# Patient Record
Sex: FEMALE | Race: WHITE | HISPANIC OR LATINO | URBAN - METROPOLITAN AREA
[De-identification: names, ages, dates, MRNs, and addresses within clinical notes are randomized per-mention and may not be internally consistent; named-entity substitution may affect disease eponyms.]

---

## 2017-02-07 ENCOUNTER — EMERGENCY (EMERGENCY)
Facility: HOSPITAL | Age: 22
LOS: 1 days | Discharge: HOME | End: 2017-02-07
Admitting: EMERGENCY MEDICINE
Payer: OTHER MISCELLANEOUS

## 2017-02-07 VITALS
TEMPERATURE: 98 F | HEART RATE: 64 BPM | DIASTOLIC BLOOD PRESSURE: 50 MMHG | SYSTOLIC BLOOD PRESSURE: 105 MMHG | RESPIRATION RATE: 16 BRPM | OXYGEN SATURATION: 100 %

## 2017-02-07 PROCEDURE — 99053 MED SERV 10PM-8AM 24 HR FAC: CPT

## 2017-02-07 PROCEDURE — 99283 EMERGENCY DEPT VISIT LOW MDM: CPT | Mod: 25

## 2017-02-07 NOTE — ED PROVIDER NOTE - PROGRESS NOTE DETAILS
The scribe's documentation has been prepared under my direction and personally reviewed by me in its entirety. I confirm that the note above accurately reflects all work, treatment, procedures, and medical decision making performed by me.  AMAN DE LA ROSA AMAN DE LA ROSA: I discussed with patient regarding prophylactic HIV meds, ppt refusing at this time, will f/u with [facility]

## 2017-02-07 NOTE — ED PROVIDER NOTE - OBJECTIVE STATEMENT
22 y/o female with no significant PMHx presents to the ED for needle stick injury at work today. 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 Unknown name of individual contacted and medical Hx. Pt rinsed and washed the are with water. Denies headache, neck pain, visual disturbances, fevers, chills, nausea, vomiting, chest pain, sob, abdominal pain, urinary symptoms, pelvic pain, vaginal bleeding, saddle anesthesia, loss of bowel/bladder, numbness/weakness/tingling, recent travel, sick contact, social history. Denies chances of pregnancy. 22 y/o female with no significant PMHx presents to the ED for needle stick injury at work today. 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 Unknown name of individual contacted and medical Hx. Pt rinsed and washed the are with water. Denies headache, neck pain, visual disturbances, fevers, chills, nausea, vomiting, chest pain, sob, abdominal pain, urinary symptoms, pelvic pain, vaginal bleeding, saddle anesthesia, loss of bowel/bladder, numbness/weakness/tingling, recent travel, sick contact, social history. Denies chances of pregnancy.

## 2017-02-07 NOTE — ED ADULT TRIAGE NOTE - CHIEF COMPLAINT QUOTE
pt. 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 and was stuck through a glove w/ the vaccutainer.  Small needle ami noted on pt.'s L pointer finger, no active bleeding noted.  Denies PMHx.

## 2017-02-07 NOTE — ED PROVIDER NOTE - NS ED MD SCRIBE ATTENDING SCRIBE SECTIONS
REVIEW OF SYSTEMS/VITAL SIGNS( Pullset)/HISTORY OF PRESENT ILLNESS/PAST MEDICAL/SURGICAL/SOCIAL HISTORY/DISPOSITION/HIV

## 2017-02-07 NOTE — ED PROVIDER NOTE - CHPI ED SYMPTOMS NEG
no nausea/no fever/no weakness/no tingling/no chills/no dizziness/no vomiting/no decreased eating/drinking/no numbness

## 2018-01-07 NOTE — ED PROVIDER NOTE - PSYCHIATRIC NEGATIVE STATEMENT, MLM
- afebrile overnight, non-toxic appearing  - c/w Vancomycin at current dose and Ceftaroline to complete on 1/10/18, Vanco trough therapeutic  - s/p PICC line replacement via IR for malpositioned PICC on 1/3 no known mental health issues.

## 2018-08-15 ENCOUNTER — RESULT REVIEW (OUTPATIENT)
Age: 23
End: 2018-08-15

## 2019-03-29 ENCOUNTER — EMERGENCY (EMERGENCY)
Facility: HOSPITAL | Age: 24
LOS: 1 days | Discharge: HOME | End: 2019-03-29
Admitting: EMERGENCY MEDICINE
Payer: OTHER MISCELLANEOUS

## 2019-03-29 VITALS
RESPIRATION RATE: 16 BRPM | DIASTOLIC BLOOD PRESSURE: 67 MMHG | TEMPERATURE: 98 F | HEART RATE: 76 BPM | OXYGEN SATURATION: 100 % | SYSTOLIC BLOOD PRESSURE: 112 MMHG

## 2019-03-29 PROCEDURE — 99282 EMERGENCY DEPT VISIT SF MDM: CPT | Mod: 25

## 2019-03-29 PROCEDURE — 99053 MED SERV 10PM-8AM 24 HR FAC: CPT

## 2019-03-29 NOTE — ED PROVIDER NOTE - NSFOLLOWUPINSTRUCTIONS_ED_ALL_ED_FT
Follow up with [facility] in 2-3 days [phone].  Clean affected area with soap and water.  Return to the ER for any persistent/worsening or new symptoms fevers, chills, redness, weakness, numbness or any concerning symptoms.

## 2019-03-29 NOTE — ED PROVIDER NOTE - OBJECTIVE STATEMENT
24 yo F with no pertinent PMHx presents to the [facility] ED s/p needle stick injury. Pt works as a PCA at [employer], she was using lancet to check source patient, glucose and stuck her self in the 4th digit right digit. Pt reports bleeding afterwards, however no active bleeding now. Slight pain over the area of entry. Denies fever, chills, discharge. Pt reports that source patient had no known PMHX of HIV, hepatitis. 24 yo F with no pertinent PMHx presents to the [facility] ED s/p needle stick injury. 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 Pt reports bleeding afterwards, however no active bleeding now. Slight pain over the area of entry. Denies fever, chills, discharge. Pt reports that source patient had no known PMHX of HIV, hepatitis.

## 2019-03-29 NOTE — ED PROVIDER NOTE - SKIN WOUND TYPE
pinpoint needle stick injury noted to 4th digit pad, no erythema, no discharge, no active bleeding/PUNCTURE WOUND(S)

## 2019-03-29 NOTE — ED PROVIDER NOTE - PLAN OF CARE
Follow up with [facility] in 2-3 days [phone].  Clean affected area with soap and water.  Return to the ER for any persistent/worsening or new symptoms fevers, chills, redness, weakness, numbness or any concerning symptoms.

## 2019-03-29 NOTE — ED PROVIDER NOTE - CARE PLAN
Principal Discharge DX:	Needle stick injury of finger  Assessment and plan of treatment:	Follow up with [facility] in 2-3 days [phone].  Clean affected area with soap and water.  Return to the ER for any persistent/worsening or new symptoms fevers, chills, redness, weakness, numbness or any concerning symptoms.

## 2019-03-29 NOTE — ED ADULT TRIAGE NOTE - CHIEF COMPLAINT QUOTE
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

## 2020-04-29 ENCOUNTER — MESSAGE (OUTPATIENT)
Age: 25
End: 2020-04-29

## 2020-05-09 LAB
SARS-COV-2 IGG SERPL IA-ACNC: 0 INDEX
SARS-COV-2 IGG SERPL QL IA: NEGATIVE

## 2021-03-05 ENCOUNTER — EMERGENCY (EMERGENCY)
Facility: HOSPITAL | Age: 26
LOS: 1 days | Discharge: HOME | End: 2021-03-05
Attending: EMERGENCY MEDICINE | Admitting: EMERGENCY MEDICINE
Payer: COMMERCIAL

## 2021-03-05 VITALS
RESPIRATION RATE: 18 BRPM | TEMPERATURE: 98 F | DIASTOLIC BLOOD PRESSURE: 85 MMHG | HEART RATE: 112 BPM | OXYGEN SATURATION: 100 % | SYSTOLIC BLOOD PRESSURE: 128 MMHG

## 2021-03-05 VITALS
HEART RATE: 100 BPM | SYSTOLIC BLOOD PRESSURE: 122 MMHG | OXYGEN SATURATION: 100 % | DIASTOLIC BLOOD PRESSURE: 69 MMHG | RESPIRATION RATE: 18 BRPM

## 2021-03-05 LAB
ALBUMIN SERPL ELPH-MCNC: 4.8 G/DL — SIGNIFICANT CHANGE UP (ref 3.3–5)
ALP SERPL-CCNC: 76 U/L — SIGNIFICANT CHANGE UP (ref 40–120)
ALT FLD-CCNC: 21 U/L — SIGNIFICANT CHANGE UP (ref 4–33)
ANION GAP SERPL CALC-SCNC: 12 MMOL/L — SIGNIFICANT CHANGE UP (ref 7–14)
APTT BLD: 36.5 SEC — HIGH (ref 27–36.3)
AST SERPL-CCNC: 23 U/L — SIGNIFICANT CHANGE UP (ref 4–32)
BASOPHILS # BLD AUTO: 0.01 K/UL — SIGNIFICANT CHANGE UP (ref 0–0.2)
BASOPHILS NFR BLD AUTO: 0.1 % — SIGNIFICANT CHANGE UP (ref 0–2)
BILIRUB SERPL-MCNC: 0.2 MG/DL — SIGNIFICANT CHANGE UP (ref 0.2–1.2)
BUN SERPL-MCNC: 15 MG/DL — SIGNIFICANT CHANGE UP (ref 7–23)
CALCIUM SERPL-MCNC: 9.8 MG/DL — SIGNIFICANT CHANGE UP (ref 8.4–10.5)
CHLORIDE SERPL-SCNC: 102 MMOL/L — SIGNIFICANT CHANGE UP (ref 98–107)
CO2 SERPL-SCNC: 26 MMOL/L — SIGNIFICANT CHANGE UP (ref 22–31)
CREAT SERPL-MCNC: 0.75 MG/DL — SIGNIFICANT CHANGE UP (ref 0.5–1.3)
EOSINOPHIL # BLD AUTO: 0.04 K/UL — SIGNIFICANT CHANGE UP (ref 0–0.5)
EOSINOPHIL NFR BLD AUTO: 0.6 % — SIGNIFICANT CHANGE UP (ref 0–6)
GLUCOSE SERPL-MCNC: 87 MG/DL — SIGNIFICANT CHANGE UP (ref 70–99)
HCT VFR BLD CALC: 39.7 % — SIGNIFICANT CHANGE UP (ref 34.5–45)
HGB BLD-MCNC: 12.8 G/DL — SIGNIFICANT CHANGE UP (ref 11.5–15.5)
IANC: 5.83 K/UL — SIGNIFICANT CHANGE UP (ref 1.5–8.5)
IMM GRANULOCYTES NFR BLD AUTO: 0.3 % — SIGNIFICANT CHANGE UP (ref 0–1.5)
INR BLD: 1.09 RATIO — SIGNIFICANT CHANGE UP (ref 0.88–1.16)
LYMPHOCYTES # BLD AUTO: 0.51 K/UL — LOW (ref 1–3.3)
LYMPHOCYTES # BLD AUTO: 7.1 % — LOW (ref 13–44)
MCHC RBC-ENTMCNC: 29.2 PG — SIGNIFICANT CHANGE UP (ref 27–34)
MCHC RBC-ENTMCNC: 32.2 GM/DL — SIGNIFICANT CHANGE UP (ref 32–36)
MCV RBC AUTO: 90.4 FL — SIGNIFICANT CHANGE UP (ref 80–100)
MONOCYTES # BLD AUTO: 0.76 K/UL — SIGNIFICANT CHANGE UP (ref 0–0.9)
MONOCYTES NFR BLD AUTO: 10.6 % — SIGNIFICANT CHANGE UP (ref 2–14)
NEUTROPHILS # BLD AUTO: 5.83 K/UL — SIGNIFICANT CHANGE UP (ref 1.8–7.4)
NEUTROPHILS NFR BLD AUTO: 81.3 % — HIGH (ref 43–77)
NRBC # BLD: 0 /100 WBCS — SIGNIFICANT CHANGE UP
NRBC # FLD: 0 K/UL — SIGNIFICANT CHANGE UP
PLATELET # BLD AUTO: 213 K/UL — SIGNIFICANT CHANGE UP (ref 150–400)
POTASSIUM SERPL-MCNC: 3.6 MMOL/L — SIGNIFICANT CHANGE UP (ref 3.5–5.3)
POTASSIUM SERPL-SCNC: 3.6 MMOL/L — SIGNIFICANT CHANGE UP (ref 3.5–5.3)
PROT SERPL-MCNC: 8.3 G/DL — SIGNIFICANT CHANGE UP (ref 6–8.3)
PROTHROM AB SERPL-ACNC: 12.4 SEC — SIGNIFICANT CHANGE UP (ref 10.6–13.6)
RBC # BLD: 4.39 M/UL — SIGNIFICANT CHANGE UP (ref 3.8–5.2)
RBC # FLD: 12.1 % — SIGNIFICANT CHANGE UP (ref 10.3–14.5)
SARS-COV-2 RNA SPEC QL NAA+PROBE: DETECTED
SODIUM SERPL-SCNC: 140 MMOL/L — SIGNIFICANT CHANGE UP (ref 135–145)
TSH SERPL-MCNC: 1.81 UIU/ML — SIGNIFICANT CHANGE UP (ref 0.27–4.2)
WBC # BLD: 7.17 K/UL — SIGNIFICANT CHANGE UP (ref 3.8–10.5)
WBC # FLD AUTO: 7.17 K/UL — SIGNIFICANT CHANGE UP (ref 3.8–10.5)

## 2021-03-05 PROCEDURE — 99284 EMERGENCY DEPT VISIT MOD MDM: CPT | Mod: 25

## 2021-03-05 PROCEDURE — 93010 ELECTROCARDIOGRAM REPORT: CPT

## 2021-03-05 PROCEDURE — 71045 X-RAY EXAM CHEST 1 VIEW: CPT | Mod: 26

## 2021-03-05 RX ORDER — ACETAMINOPHEN 500 MG
975 TABLET ORAL ONCE
Refills: 0 | Status: COMPLETED | OUTPATIENT
Start: 2021-03-05 | End: 2021-03-05

## 2021-03-05 RX ORDER — SODIUM CHLORIDE 9 MG/ML
1000 INJECTION INTRAMUSCULAR; INTRAVENOUS; SUBCUTANEOUS ONCE
Refills: 0 | Status: COMPLETED | OUTPATIENT
Start: 2021-03-05 | End: 2021-03-05

## 2021-03-05 RX ADMIN — Medication 975 MILLIGRAM(S): at 16:37

## 2021-03-05 RX ADMIN — SODIUM CHLORIDE 1000 MILLILITER(S): 9 INJECTION INTRAMUSCULAR; INTRAVENOUS; SUBCUTANEOUS at 15:06

## 2021-03-05 RX ADMIN — Medication 975 MILLIGRAM(S): at 15:30

## 2021-03-05 NOTE — ED PROVIDER NOTE - NSFOLLOWUPINSTRUCTIONS_ED_ALL_ED_FT
-- Please avoid contact with others while you are symptomatic and for two weeks.  -- This is likely a virus, possibly COVID 19.  There is no direct treatment for a virus, and antibiotics are not effective.     -- Rest, increase your fluid intake, and avoid dehydration.  -- You can check your oxygen levels at home with a Pulse Oximeter device (pulse ox).  -- Take Tylenol for fever of 100.4 or greater.  -- It is very important to be diligent about hand washing & hygiene, wearing a mask, and self quarantining to avoid spreading the illness to others.  -- If you are having any new or worsening symptoms, such as shortness of breath, chest pain, fainting, please see your doctor or return to the Emergency Department.  -- You may call [phone] or [phone] for your COVID swab results.

## 2021-03-05 NOTE — ED ADULT NURSE NOTE - NSSEPSISSUSPECTED_ED_A_ED
Encounter Date: 4/6/2019       History     Chief Complaint   Patient presents with    Possible Pregnancy     The patient is a 24-year-old female who presents the emergency department today with complaints of headache. States the headache is been going on for month 3 4 days and worsening over the past 2.  The headache is bitemporal.  She describes it as sharp per stabbing in character.  And severe.  States that is accompanied by nausea and 2 episodes of vomiting over the past 2 days.  She does not complain of fever or nuchal rigidity.  She is not on any medications denies any surgeries in the past reports that she is about 2 months past her last menstrual cycle.  Has taken home pregnancy test which are negative.        Review of patient's allergies indicates:  No Known Allergies  History reviewed. No pertinent past medical history.  History reviewed. No pertinent surgical history.  History reviewed. No pertinent family history.  Social History     Tobacco Use    Smoking status: Never Smoker    Smokeless tobacco: Never Used   Substance Use Topics    Alcohol use: Not on file    Drug use: Not on file     Review of Systems   Constitutional: Negative for fever.   HENT: Negative for sore throat.    Respiratory: Negative for shortness of breath.    Cardiovascular: Negative for chest pain.   Gastrointestinal: Positive for nausea and vomiting (that accompany headaches).   Endocrine: Negative for polydipsia, polyphagia and polyuria.   Genitourinary: Positive for menstrual problem (two months since last menstrual period). Negative for dysuria, vaginal bleeding, vaginal discharge and vaginal pain.   Musculoskeletal: Negative for back pain.   Skin: Negative for rash.   Neurological: Positive for headaches. Negative for seizures, syncope, speech difficulty, weakness and numbness.   Hematological: Does not bruise/bleed easily.   All other systems reviewed and are negative.      Physical Exam     Initial Vitals [04/06/19 1904]    BP Pulse Resp Temp SpO2   (!) 138/93 69 18 97.2 °F (36.2 °C) 100 %      MAP       --         Physical Exam    Nursing note and vitals reviewed.  Constitutional: She appears well-developed and well-nourished. She is not diaphoretic. She is active and cooperative.  Non-toxic appearance. She does not have a sickly appearance. She does not appear ill. She appears distressed.   HENT:   Head: Normocephalic and atraumatic. Head is without abrasion, without contusion and without laceration.       Nose: Nose normal.   Eyes: EOM are normal. Pupils are equal, round, and reactive to light.   Neck: Normal range of motion. Neck supple.   Cardiovascular: Normal rate.   Pulmonary/Chest: Breath sounds normal. No respiratory distress.   Abdominal: Soft. Bowel sounds are normal.   Musculoskeletal: Normal range of motion. She exhibits no edema or tenderness.   Neurological: She is alert and oriented to person, place, and time. She has normal strength. GCS score is 15. GCS eye subscore is 4. GCS verbal subscore is 5. GCS motor subscore is 6.   Skin: Skin is warm and dry. Capillary refill takes less than 2 seconds.   Psychiatric: She has a normal mood and affect.         ED Course   Procedures  Labs Reviewed   PREGNANCY TEST, URINE RAPID          Imaging Results    None                               Clinical Impression:       ICD-10-CM ICD-9-CM   1. Secondary amenorrhea N91.1 626.0   2. Acute nonintractable headache, unspecified headache type R51 784.0                                Jossue Rome MD  04/06/19 5532     No

## 2021-03-05 NOTE — ED PROVIDER NOTE - OBJECTIVE STATEMENT
26 y/o female with no pmhx presents to ED c/o sore throat, body aches, dry cough, lightheadedness and tachycardia today. 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 Pt states she woke up with symptoms today and while at work, started feeling lightheaded, her vitals were taken and her heart rate was high in the 120s. Pt denies any chest pain or SOB. No hemoptysis. No estrogen use, recent travel, surgeries, hospitalizations, le edema, calf pain, hx of dvt/pe. Pt states her LMP was in January 1-2 months ago and is sexually active. No fever, chills, difficulty swallowing, abd pain, n/v/d.

## 2021-03-05 NOTE — ED PROVIDER NOTE - PATIENT PORTAL LINK FT
You can access the FollowMyHealth Patient Portal offered by [facility] by registering at the following website: [email or web address]. By joining [facility]’s FollowMyHealth portal, you will also be able to view your health information using other applications (apps) compatible with our system.

## 2021-03-05 NOTE — ED PROVIDER NOTE - CLINICAL SUMMARY MEDICAL DECISION MAKING FREE TEXT BOX
24 y/o female with no pmhx presents to ED c/o sore throat, body aches, dry cough, lightheadedness and tachycardia today. 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 Pt states she woke up with symptoms today and while at work, started feeling lightheaded, her vitals were taken and her heart rate was high in the 120s. No hemoptysis. No estrogen use, recent travel, surgeries, hospitalizations, le edema, calf pain, hx of dvt/pe. Pt states her LMP was in January 1-2 months ago and is sexually active. pt well appearing, NAD. EKG sinus tachycardia, sating well on RA. afebrile orally. no signs of respiratory distress, no chest pain or SOB. concern for possible viral syndrome, COVID. plan to check rectal temp for fever, hydrate, check labs including TSH, cxr, pregnancy test, reassess.

## 2021-03-05 NOTE — ED ADULT NURSE REASSESSMENT NOTE - SYMPTOMS
states she feels much better after fluids, denies feeling light headed. ambulatory in room, remains slightly tachycardiac but improved from arrival.

## 2021-03-05 NOTE — ED PROVIDER NOTE - OTHER FINDINGS
Pt reports no BM since admission (8/23). PRN Milk of Magnesia administered. Will monitor for effectiveness.    QT/QTc = 322/ 447 ms

## 2021-03-05 NOTE — ED PROVIDER NOTE - ATTENDING CONTRIBUTION TO CARE
Patient is a 24 yo F with no chronic medical problems here for evaluation of myalgias, dry cough, sore throat, feeling lightheaded since this morning. Patient states she woke up with these symptoms. Denies chest pain, shortness of breath. No travel. She works as a PCA at [employer], does work with COVID19 patients. She states she has not been vaccinated, in general does not like getting vaccines and avoids the flu shot yearly. No OCP use.     VS noted  Gen. no acute distress, Non toxic   HEENT: EOMI, mmm, tachycardic  Lungs: CTAB/L no C/ W /R   CVS: RRR   Abd; Soft non tender, non distended   Ext: no edema, no calf tenderness  Skin: no rash  Neuro AAOx3 non focal clear speech  a/p: myalgias, dry cough, sore throat, lightheaded - concern for viral illness. pt states she feels very anxious. No abd pain, non focal exam except tachycardia. plan for ekg, labs, u/a, CXR, COVID19 swab.   - Ann OSCAR

## 2021-03-05 NOTE — ED ADULT NURSE NOTE - OBJECTIVE STATEMENT
Pt received to intake room 4. Comes to ED presenting with mild cough, chills, lightheadedness, palpitations, and dizziness starting today. 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 Pt has not had her covid vaccine. Respirations are even & unlabored, denies chest pain, dyspnea, N/V/D, weakness, headaches. Denies known PMH. 20 G IV placed to left AC. Pt is A&Ox4, ambulates independently.

## 2021-03-05 NOTE — ED ADULT TRIAGE NOTE - CHIEF COMPLAINT QUOTE
Pt c/o feeling lightheaded and tachycardic starting this morning, pt denies any present dizziness, no chest pain, no shortness of breath. No past medical history.

## 2021-03-05 NOTE — ED PROVIDER NOTE - PROGRESS NOTE DETAILS
100.1 temperature, fluids given, will give tylenol Ann OSCAR: Patient remains stable, mildly tachycardic but c/o feeling anxious. Work up negative. No PE risk factors. No chest pain or shortness of breath. Plan for discharge with strict return precautions. AMAN Casas- hr improved, at 100. pt states she is feeling better, mildly anxious and would prefer to be discharged home. will dc w/ pmd follow up pt given return precautions AMAN Casas- hr improved, at 100. pt states she is feeling better, mildly anxious and would prefer to be discharged home. pt states her brother in law recently passed away from COVID and had a stroke at age 28 and has been anxious, following up with psychologist. pt given return precautions

## 2021-03-06 NOTE — ED POST DISCHARGE NOTE - REASON FOR FOLLOW-UP
Other Patient called about COVID result. COVID DETECTED, patient advised on 14 day quarantine, and given instructions on symptomatic care as well as return precautions. Patient notes feeling body aches and intermittent headaches. Denies any respiratory issues at this time. Patient understands isolation precautions and return precautions.

## 2022-03-29 ENCOUNTER — OUTPATIENT (OUTPATIENT)
Dept: OUTPATIENT SERVICES | Facility: HOSPITAL | Age: 27
LOS: 1 days | End: 2022-03-29
Payer: COMMERCIAL

## 2022-03-29 ENCOUNTER — APPOINTMENT (OUTPATIENT)
Dept: PRIMARY CARE | Facility: HOSPITAL | Age: 27
End: 2022-03-29

## 2022-03-29 ENCOUNTER — APPOINTMENT (OUTPATIENT)
Dept: RADIOLOGY | Facility: HOSPITAL | Age: 27
End: 2022-03-29

## 2022-03-29 VITALS
BODY MASS INDEX: 26.43 KG/M2 | DIASTOLIC BLOOD PRESSURE: 76 MMHG | HEIGHT: 61 IN | HEART RATE: 91 BPM | TEMPERATURE: 98.2 F | OXYGEN SATURATION: 100 % | SYSTOLIC BLOOD PRESSURE: 116 MMHG | WEIGHT: 140 LBS
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
## 2022-03-29 DIAGNOSIS — S93.601A UNSPECIFIED SPRAIN OF RIGHT FOOT, INITIAL ENCOUNTER: ICD-10-CM
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
## 2022-03-29 PROBLEM — Z00.00 ENCOUNTER FOR PREVENTIVE HEALTH EXAMINATION: Status: ACTIVE | Noted: 2022-03-29

## 2022-03-29 PROCEDURE — 73620 X-RAY EXAM OF FOOT: CPT | Mod: 26,RT

## 2022-03-29 RX ORDER — IBUPROFEN 600 MG/1
600 TABLET, FILM COATED ORAL 3 TIMES DAILY
Qty: 45 | Refills: 1 | Status: ACTIVE | COMMUNITY
Start: 2022-03-29 | End: 1900-01-01

## 2022-03-29 NOTE — PHYSICAL EXAM
[No Acute Distress] : no acute distress [Normal Outer Ear/Nose] : the outer ears and nose were normal in appearance [No Respiratory Distress] : no respiratory distress  [No Accessory Muscle Use] : no accessory muscle use [Clear to Auscultation] : lungs were clear to auscultation bilaterally [Normal Rate] : normal rate  [Regular Rhythm] : with a regular rhythm [Normal S1, S2] : normal S1 and S2 [No Focal Deficits] : no focal deficits [Normal Gait] : normal gait [Normal Affect] : the affect was normal [Normal Insight/Judgement] : insight and judgment were intact [de-identified] : no tonsular swelling, no tonsular exudates and no maxilary tenderness  [de-identified] : no wheezing, no rhonchi and no crackles  [de-identified] : no chest tenderness  [de-identified] : right foot tenderness on dorsal aspect in between the proximal phalynx of he right big toe and right 2nd toe, slight swelling on dorsal aspect of the right foot

## 2022-03-29 NOTE — HISTORY OF PRESENT ILLNESS
[FreeTextEntry8] : 26F  NKDA with no PMH and no PSH [facility].\par \par States that she felt pain on her right foot pain 3 days ago after she exercised in the gym. She can't remember if she hit her right foot during exercise but she felt the pain 7/10 pain scale since then. Described as slight swelling on anterior distal phalyxn of right big toe and right 2nd toe associated with throbbing pain and pinching pain in between right 2nd toe and right big toe. She denies any numbness, tingling sensation and pin and needles. \par \par With regards to COVID, she has been fully vaccinated and boosted. Denies any fever, cough, sore throat or SOB. No loss of smell or taste, no chest tightness, or any GI related symptoms of nausea, vomiting or diarrhea. \par \par \par

## 2022-03-31 ENCOUNTER — TRANSCRIPTION ENCOUNTER (OUTPATIENT)
Age: 27
End: 2022-03-31

## 2022-05-10 NOTE — ED PROVIDER NOTE - CLINICAL SUMMARY MEDICAL DECISION MAKING FREE TEXT BOX
None 22 yo F with needle stick injury, employee, no active bleeding.   VSS.   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   VSS.   Discussion of PEP HIV medication patient deciding to decline the prophylaxis at this time, will follow with employee health.   AND aware of needlestick, source patient to be tested.

## 2022-07-04 NOTE — ED PROVIDER NOTE - PROGRESS NOTE ADDITIONAL1
[facility]    Central line    Date/Time: 7/4/2022 5:30 PM  Performed by: John Denny NP  Authorized by: Jus Jones MD   Indications: vascular access      UNIVERSAL PROTOCOL   Site Marked: NA  Prior Images Obtained and Reviewed:  NA  Required items: Required blood products, implants, devices and special equipment available    Patient identity confirmed:  Arm band and hospital-assigned identification number  NA - No sedation, light sedation, or local anesthesia  Confirmation Checklist:  Patient's identity using two indicators, relevant allergies, procedure was appropriate and matched the consent or emergent situation and correct equipment/implants were available  Time out: Immediately prior to the procedure a time out was called    Universal Protocol: the Joint Commission Universal Protocol was followed    Preparation: Patient was prepped and draped in usual sterile fashion       ANESTHESIA    Anesthesia: Local infiltration  Local Anesthetic:  Lidocaine 1% without epinephrine      SEDATION    Patient Sedated: No      Preparation: skin prepped with 2% chlorhexidine and skin prepped with ChloraPrep  Skin prep agent dried: skin prep agent completely dried prior to procedure  Sterile barriers: all five maximum sterile barriers used - cap, mask, sterile gown, sterile gloves, and large sterile sheet  Hand hygiene: hand hygiene performed prior to central venous catheter insertion  Patient position: Trendelenburg  Pre-procedure: landmarks identified  Number of attempts: 2  Successful placement: yes  Post-procedure: line sutured  Assessment: blood return through all ports,  placement verified by x-ray and no pneumothorax on x-ray      PROCEDURE    Patient Tolerance:  Patient tolerated the procedure well with no immediate complications  Length of time physician/provider present for 1:1 monitoring during sedation: 30         Additional Progress Note...

## 2022-12-19 ENCOUNTER — NON-APPOINTMENT (OUTPATIENT)
Age: 27
End: 2022-12-19

## 2024-08-11 ENCOUNTER — NON-APPOINTMENT (OUTPATIENT)
Age: 29
End: 2024-08-11

## 2025-01-02 ENCOUNTER — NON-APPOINTMENT (OUTPATIENT)
Age: 30
End: 2025-01-02

## 2025-02-05 ENCOUNTER — APPOINTMENT (OUTPATIENT)
Dept: INFECTIOUS DISEASE | Facility: CLINIC | Age: 30
End: 2025-02-05
Payer: COMMERCIAL

## 2025-02-05 VITALS
OXYGEN SATURATION: 99 % | HEIGHT: 61 IN | HEART RATE: 90 BPM | WEIGHT: 138 LBS | SYSTOLIC BLOOD PRESSURE: 124 MMHG | DIASTOLIC BLOOD PRESSURE: 82 MMHG | BODY MASS INDEX: 26.06 KG/M2 | TEMPERATURE: 98.3 F

## 2025-02-05 DIAGNOSIS — J34.0 ABSCESS, FURUNCLE AND CARBUNCLE OF NOSE: ICD-10-CM

## 2025-02-05 DIAGNOSIS — Z22.322 CARRIER OR SUSPECTED CARRIER OF METHICILLIN RESISTANT STAPHYLOCOCCUS AUREUS: ICD-10-CM

## 2025-02-05 DIAGNOSIS — Z87.891 PERSONAL HISTORY OF NICOTINE DEPENDENCE: ICD-10-CM

## 2025-02-05 DIAGNOSIS — Z83.3 FAMILY HISTORY OF DIABETES MELLITUS: ICD-10-CM

## 2025-02-05 LAB
MRSA SPEC QL CULT: NOT DETECTED
STAPH AUREUS (SA): NOT DETECTED

## 2025-02-05 PROCEDURE — 99203 OFFICE O/P NEW LOW 30 MIN: CPT

## 2025-02-05 RX ORDER — CHLORHEXIDINE GLUCONATE 20 %
SOLUTION, NON-ORAL MISCELLANEOUS
Qty: 1 | Refills: 4 | Status: ACTIVE | COMMUNITY
Start: 2025-02-05 | End: 1900-01-01

## 2025-02-05 RX ORDER — MUPIROCIN 20 MG/G
2 OINTMENT TOPICAL
Qty: 2 | Refills: 4 | Status: ACTIVE | COMMUNITY
Start: 2025-02-05 | End: 1900-01-01

## 2025-02-08 LAB — BACTERIA NOSE AEROBE CULT: NORMAL

## 2025-02-18 ENCOUNTER — APPOINTMENT (OUTPATIENT)
Dept: ULTRASOUND IMAGING | Facility: CLINIC | Age: 30
End: 2025-02-18

## 2025-02-24 ENCOUNTER — TRANSCRIPTION ENCOUNTER (OUTPATIENT)
Age: 30
End: 2025-02-24

## 2025-02-24 ENCOUNTER — APPOINTMENT (OUTPATIENT)
Dept: INFECTIOUS DISEASE | Facility: CLINIC | Age: 30
End: 2025-02-24
Payer: COMMERCIAL

## 2025-02-24 VITALS
TEMPERATURE: 98.2 F | HEART RATE: 96 BPM | BODY MASS INDEX: 26.43 KG/M2 | SYSTOLIC BLOOD PRESSURE: 115 MMHG | DIASTOLIC BLOOD PRESSURE: 75 MMHG | OXYGEN SATURATION: 98 % | WEIGHT: 140 LBS | HEIGHT: 61 IN

## 2025-02-24 DIAGNOSIS — Z22.322 CARRIER OR SUSPECTED CARRIER OF METHICILLIN RESISTANT STAPHYLOCOCCUS AUREUS: ICD-10-CM

## 2025-02-24 PROCEDURE — 99212 OFFICE O/P EST SF 10 MIN: CPT

## 2025-02-28 LAB
BACTERIA NOSE AEROBE CULT: NORMAL
MRSA SPEC QL CULT: NOT DETECTED
STAPH AUREUS (SA): NOT DETECTED